# Patient Record
Sex: FEMALE | Race: WHITE | Employment: OTHER
[De-identification: names, ages, dates, MRNs, and addresses within clinical notes are randomized per-mention and may not be internally consistent; named-entity substitution may affect disease eponyms.]

---

## 2023-01-24 ENCOUNTER — OFFICE VISIT (OUTPATIENT)
Dept: INTERNAL MEDICINE CLINIC | Facility: CLINIC | Age: 63
End: 2023-01-24
Payer: COMMERCIAL

## 2023-01-24 VITALS
DIASTOLIC BLOOD PRESSURE: 78 MMHG | OXYGEN SATURATION: 97 % | HEART RATE: 72 BPM | SYSTOLIC BLOOD PRESSURE: 149 MMHG | WEIGHT: 125 LBS | TEMPERATURE: 97.3 F | BODY MASS INDEX: 19.62 KG/M2 | HEIGHT: 67 IN

## 2023-01-24 DIAGNOSIS — M25.562 CHRONIC PAIN OF BOTH KNEES: Chronic | ICD-10-CM

## 2023-01-24 DIAGNOSIS — M25.561 CHRONIC PAIN OF BOTH KNEES: Chronic | ICD-10-CM

## 2023-01-24 DIAGNOSIS — F33.0 MILD EPISODE OF RECURRENT MAJOR DEPRESSIVE DISORDER (HCC): ICD-10-CM

## 2023-01-24 DIAGNOSIS — Z00.00 ROUTINE GENERAL MEDICAL EXAMINATION AT A HEALTH CARE FACILITY: Primary | ICD-10-CM

## 2023-01-24 DIAGNOSIS — G47.00 INSOMNIA, UNSPECIFIED TYPE: Chronic | ICD-10-CM

## 2023-01-24 DIAGNOSIS — G43.109 MIGRAINE WITH AURA AND WITHOUT STATUS MIGRAINOSUS, NOT INTRACTABLE: ICD-10-CM

## 2023-01-24 DIAGNOSIS — G89.29 CHRONIC PAIN OF BOTH KNEES: Chronic | ICD-10-CM

## 2023-01-24 DIAGNOSIS — I10 PRIMARY HYPERTENSION: ICD-10-CM

## 2023-01-24 DIAGNOSIS — Z23 NEED FOR SHINGLES VACCINE: ICD-10-CM

## 2023-01-24 DIAGNOSIS — R00.2 PALPITATIONS: Chronic | ICD-10-CM

## 2023-01-24 LAB
25(OH)D3 SERPL-MCNC: 16.7 NG/ML (ref 30–100)
ALBUMIN SERPL-MCNC: 4.2 G/DL (ref 3.2–4.6)
ALBUMIN/GLOB SERPL: 1.4 (ref 0.4–1.6)
ALP SERPL-CCNC: 82 U/L (ref 50–136)
ALT SERPL-CCNC: 18 U/L (ref 12–65)
ANION GAP SERPL CALC-SCNC: 8 MMOL/L (ref 2–11)
AST SERPL-CCNC: 12 U/L (ref 15–37)
BASOPHILS # BLD: 0.1 K/UL (ref 0–0.2)
BASOPHILS NFR BLD: 1 % (ref 0–2)
BILIRUB SERPL-MCNC: 0.2 MG/DL (ref 0.2–1.1)
BUN SERPL-MCNC: 13 MG/DL (ref 8–23)
CALCIUM SERPL-MCNC: 9.6 MG/DL (ref 8.3–10.4)
CHLORIDE SERPL-SCNC: 108 MMOL/L (ref 101–110)
CHOLEST SERPL-MCNC: 219 MG/DL
CO2 SERPL-SCNC: 27 MMOL/L (ref 21–32)
CREAT SERPL-MCNC: 0.9 MG/DL (ref 0.6–1)
DIFFERENTIAL METHOD BLD: NORMAL
EOSINOPHIL # BLD: 0.1 K/UL (ref 0–0.8)
EOSINOPHIL NFR BLD: 1 % (ref 0.5–7.8)
ERYTHROCYTE [DISTWIDTH] IN BLOOD BY AUTOMATED COUNT: 13.4 % (ref 11.9–14.6)
GLOBULIN SER CALC-MCNC: 3.1 G/DL (ref 2.8–4.5)
GLUCOSE SERPL-MCNC: 59 MG/DL (ref 65–100)
HCT VFR BLD AUTO: 44.6 % (ref 35.8–46.3)
HCV AB SER QL: NONREACTIVE
HDLC SERPL-MCNC: 63 MG/DL (ref 40–60)
HDLC SERPL: 3.5
HGB BLD-MCNC: 14.8 G/DL (ref 11.7–15.4)
HIV 1+2 AB+HIV1 P24 AG SERPL QL IA: NONREACTIVE
HIV 1/2 RESULT COMMENT: NORMAL
IMM GRANULOCYTES # BLD AUTO: 0 K/UL (ref 0–0.5)
IMM GRANULOCYTES NFR BLD AUTO: 0 % (ref 0–5)
LDLC SERPL CALC-MCNC: 128.8 MG/DL
LYMPHOCYTES # BLD: 1.9 K/UL (ref 0.5–4.6)
LYMPHOCYTES NFR BLD: 23 % (ref 13–44)
MCH RBC QN AUTO: 28.7 PG (ref 26.1–32.9)
MCHC RBC AUTO-ENTMCNC: 33.2 G/DL (ref 31.4–35)
MCV RBC AUTO: 86.4 FL (ref 82–102)
MONOCYTES # BLD: 0.3 K/UL (ref 0.1–1.3)
MONOCYTES NFR BLD: 4 % (ref 4–12)
NEUTS SEG # BLD: 5.9 K/UL (ref 1.7–8.2)
NEUTS SEG NFR BLD: 71 % (ref 43–78)
NRBC # BLD: 0 K/UL (ref 0–0.2)
PLATELET # BLD AUTO: 302 K/UL (ref 150–450)
PMV BLD AUTO: 10.2 FL (ref 9.4–12.3)
POTASSIUM SERPL-SCNC: 4.2 MMOL/L (ref 3.5–5.1)
PROT SERPL-MCNC: 7.3 G/DL (ref 6.3–8.2)
RBC # BLD AUTO: 5.16 M/UL (ref 4.05–5.2)
SODIUM SERPL-SCNC: 143 MMOL/L (ref 133–143)
TRIGL SERPL-MCNC: 136 MG/DL (ref 35–150)
TSH W FREE THYROID IF ABNORMAL: 1 UIU/ML (ref 0.36–3.74)
VLDLC SERPL CALC-MCNC: 27.2 MG/DL (ref 6–23)
WBC # BLD AUTO: 8.3 K/UL (ref 4.3–11.1)

## 2023-01-24 PROCEDURE — 3077F SYST BP >= 140 MM HG: CPT | Performed by: INTERNAL MEDICINE

## 2023-01-24 PROCEDURE — 99205 OFFICE O/P NEW HI 60 MIN: CPT | Performed by: INTERNAL MEDICINE

## 2023-01-24 PROCEDURE — 3078F DIAST BP <80 MM HG: CPT | Performed by: INTERNAL MEDICINE

## 2023-01-24 RX ORDER — UREA 10 %
1 LOTION (ML) TOPICAL NIGHTLY PRN
COMMUNITY

## 2023-01-24 RX ORDER — FLUOXETINE 10 MG/1
10 CAPSULE ORAL DAILY
COMMUNITY
Start: 2023-01-17

## 2023-01-24 RX ORDER — ZOSTER VACCINE RECOMBINANT, ADJUVANTED 50 MCG/0.5
0.5 KIT INTRAMUSCULAR SEE ADMIN INSTRUCTIONS
Qty: 0.5 ML | Refills: 0 | Status: SHIPPED | OUTPATIENT
Start: 2023-01-24 | End: 2023-07-23

## 2023-01-24 RX ORDER — OLMESARTAN MEDOXOMIL 5 MG/1
5 TABLET ORAL DAILY
COMMUNITY
Start: 2022-10-11 | End: 2023-04-09

## 2023-01-24 SDOH — ECONOMIC STABILITY: TRANSPORTATION INSECURITY
IN THE PAST 12 MONTHS, HAS LACK OF TRANSPORTATION KEPT YOU FROM MEETINGS, WORK, OR FROM GETTING THINGS NEEDED FOR DAILY LIVING?: NO

## 2023-01-24 SDOH — ECONOMIC STABILITY: FOOD INSECURITY: WITHIN THE PAST 12 MONTHS, THE FOOD YOU BOUGHT JUST DIDN'T LAST AND YOU DIDN'T HAVE MONEY TO GET MORE.: NEVER TRUE

## 2023-01-24 SDOH — ECONOMIC STABILITY: HOUSING INSECURITY: IN THE LAST 12 MONTHS, HOW MANY PLACES HAVE YOU LIVED?: 1

## 2023-01-24 SDOH — ECONOMIC STABILITY: TRANSPORTATION INSECURITY
IN THE PAST 12 MONTHS, HAS THE LACK OF TRANSPORTATION KEPT YOU FROM MEDICAL APPOINTMENTS OR FROM GETTING MEDICATIONS?: NO

## 2023-01-24 SDOH — HEALTH STABILITY: PHYSICAL HEALTH: ON AVERAGE, HOW MANY DAYS PER WEEK DO YOU ENGAGE IN MODERATE TO STRENUOUS EXERCISE (LIKE A BRISK WALK)?: 0 DAYS

## 2023-01-24 SDOH — ECONOMIC STABILITY: INCOME INSECURITY: IN THE LAST 12 MONTHS, WAS THERE A TIME WHEN YOU WERE NOT ABLE TO PAY THE MORTGAGE OR RENT ON TIME?: NO

## 2023-01-24 SDOH — ECONOMIC STABILITY: HOUSING INSECURITY
IN THE LAST 12 MONTHS, WAS THERE A TIME WHEN YOU DID NOT HAVE A STEADY PLACE TO SLEEP OR SLEPT IN A SHELTER (INCLUDING NOW)?: NO

## 2023-01-24 SDOH — ECONOMIC STABILITY: FOOD INSECURITY: WITHIN THE PAST 12 MONTHS, YOU WORRIED THAT YOUR FOOD WOULD RUN OUT BEFORE YOU GOT MONEY TO BUY MORE.: NEVER TRUE

## 2023-01-24 SDOH — HEALTH STABILITY: PHYSICAL HEALTH: ON AVERAGE, HOW MANY MINUTES DO YOU ENGAGE IN EXERCISE AT THIS LEVEL?: 0 MIN

## 2023-01-24 ASSESSMENT — ENCOUNTER SYMPTOMS
SINUS PRESSURE: 1
COUGH: 0
SHORTNESS OF BREATH: 0
DIARRHEA: 0
CONSTIPATION: 0

## 2023-01-24 ASSESSMENT — SOCIAL DETERMINANTS OF HEALTH (SDOH)
WITHIN THE LAST YEAR, HAVE TO BEEN RAPED OR FORCED TO HAVE ANY KIND OF SEXUAL ACTIVITY BY YOUR PARTNER OR EX-PARTNER?: NO
HOW OFTEN DO YOU ATTEND CHURCH OR RELIGIOUS SERVICES?: NEVER
WITHIN THE LAST YEAR, HAVE YOU BEEN HUMILIATED OR EMOTIONALLY ABUSED IN OTHER WAYS BY YOUR PARTNER OR EX-PARTNER?: NO
IN A TYPICAL WEEK, HOW MANY TIMES DO YOU TALK ON THE PHONE WITH FAMILY, FRIENDS, OR NEIGHBORS?: MORE THAN THREE TIMES A WEEK
WITHIN THE LAST YEAR, HAVE YOU BEEN AFRAID OF YOUR PARTNER OR EX-PARTNER?: NO
HOW HARD IS IT FOR YOU TO PAY FOR THE VERY BASICS LIKE FOOD, HOUSING, MEDICAL CARE, AND HEATING?: NOT HARD AT ALL
DO YOU BELONG TO ANY CLUBS OR ORGANIZATIONS SUCH AS CHURCH GROUPS UNIONS, FRATERNAL OR ATHLETIC GROUPS, OR SCHOOL GROUPS?: NO
HOW OFTEN DO YOU ATTENT MEETINGS OF THE CLUB OR ORGANIZATION YOU BELONG TO?: NEVER
WITHIN THE LAST YEAR, HAVE YOU BEEN KICKED, HIT, SLAPPED, OR OTHERWISE PHYSICALLY HURT BY YOUR PARTNER OR EX-PARTNER?: NO
HOW OFTEN DO YOU GET TOGETHER WITH FRIENDS OR RELATIVES?: NEVER

## 2023-01-24 ASSESSMENT — PATIENT HEALTH QUESTIONNAIRE - PHQ9
SUM OF ALL RESPONSES TO PHQ QUESTIONS 1-9: 2
1. LITTLE INTEREST OR PLEASURE IN DOING THINGS: SEVERAL DAYS
1. LITTLE INTEREST OR PLEASURE IN DOING THINGS: 1
SUM OF ALL RESPONSES TO PHQ QUESTIONS 1-9: 2
SUM OF ALL RESPONSES TO PHQ9 QUESTIONS 1 & 2: 2
10. IF YOU CHECKED OFF ANY PROBLEMS, HOW DIFFICULT HAVE THESE PROBLEMS MADE IT FOR YOU TO DO YOUR WORK, TAKE CARE OF THINGS AT HOME, OR GET ALONG WITH OTHER PEOPLE: SOMEWHAT DIFFICULT
SUM OF ALL RESPONSES TO PHQ9 QUESTIONS 1 & 2: 2
2. FEELING DOWN, DEPRESSED OR HOPELESS: 1
2. FEELING DOWN, DEPRESSED OR HOPELESS: SEVERAL DAYS

## 2023-01-24 ASSESSMENT — LIFESTYLE VARIABLES
HOW MANY STANDARD DRINKS CONTAINING ALCOHOL DO YOU HAVE ON A TYPICAL DAY: 1 OR 2
HOW OFTEN DO YOU HAVE A DRINK CONTAINING ALCOHOL: MONTHLY OR LESS

## 2023-01-24 NOTE — PROGRESS NOTES
Chief Complaint   Patient presents with    Establish Care     Pt presents to the office today to establish care as a new pt. Knee Pain     Pt has had knee pain for years. Describes it has dull but sharp at times. HPI  The patient comes to the clinic to establish care. She has a history of palpitations, which she has in the past attributed to stress, mostly from her former job. She is no longer working and has not felt frequent palpitations as of late. In addition, she mentions a diagnosis of depresison and anxiety; she has taken some antidepressant since about age 23. She adds frequent headache; she has been diagnosed with migraine and optic neuritis. She described visual symptoms (blindness in center of vision initially,) for about thirty minutes before headache onset. He headaches usually last from 5 hours to 2 days at most. She takes acetaminophen and ibuprofen as needed. Ms. Valentin Garcia medical history also includes hypertension, she was diagnosed about 4-5 months ago. She is adherent to her medication and reports no side effects to it. She does not check her BP at home, however. Additionally, she mentions osteoarthritis, which in her case is manifested mainly on her knees. Lastly, she says she has been diagnosed with chondromalacia. The patient lives in [place] with her ; her family is in [place]. She enjoys knitting and is planning on working in the yard more when the weather warms up. She has an dog that she loves to take care of. Reviewed and updated this visit by provider:  Tobacco  Allergies  Meds  Problems  Med Hx  Surg Hx  Fam Hx       Review of Systems   Constitutional:  Negative for chills and fever. HENT:  Positive for sinus pressure. Respiratory:  Negative for cough and shortness of breath. Cardiovascular:  Positive for palpitations. Negative for chest pain. Gastrointestinal:  Negative for constipation and diarrhea.    Genitourinary:  Negative for dysuria and hematuria. Musculoskeletal:  Positive for arthralgias. Negative for myalgias. Skin:  Negative for pallor and rash. Neurological:  Positive for headaches. Negative for syncope. Visit Vitals  BP (!) 149/78 (Site: Left Upper Arm, Position: Sitting, Cuff Size: Medium Adult)   Pulse 72   Temp 97.3 °F (36.3 °C) (Temporal)   Ht 5' 7\" (1.702 m)   Wt 125 lb (56.7 kg)   SpO2 97%   BMI 19.58 kg/m²     Physical Exam  Constitutional:       General: She is not in acute distress. Appearance: Normal appearance. HENT:      Mouth/Throat:      Pharynx: No oropharyngeal exudate. Eyes:      Extraocular Movements: Extraocular movements intact. Conjunctiva/sclera: Conjunctivae normal.      Pupils: Pupils are equal, round, and reactive to light. Cardiovascular:      Rate and Rhythm: Normal rate and regular rhythm. Heart sounds: Normal heart sounds. No murmur heard. No gallop. Pulmonary:      Effort: Pulmonary effort is normal. No respiratory distress. Breath sounds: Normal breath sounds. No wheezing. Abdominal:      Tenderness: There is no abdominal tenderness. There is no guarding. Musculoskeletal:      Cervical back: Normal range of motion and neck supple. No tenderness. Skin:     General: Skin is warm and dry. Findings: No rash. Neurological:      Mental Status: She is alert. Mental status is at baseline. Psychiatric:         Mood and Affect: Mood normal.         Behavior: Behavior normal.     Assessment/Plan:  1. Routine general medical examination at a health care facility  -     TSH with Reflex; Future  -     Homocysteine, Plasma; Future  -     HIV 1/2 Ag/Ab, 4TH Generation,W Rflx Confirm; Future  -     Vitamin D 25 Hydroxy; Future  -     Lipid Panel; Future  -     Comprehensive Metabolic Panel; Future  -     CBC with Auto Differential; Future  -     Hepatitis C Antibody; Future  2.  Primary hypertension  Overview:  Patient advised to check her blood pressure at home on a daily basis and bring a log on her next appointment. Will educate on the importance of low dietary sodium food choices. 3. Palpitations  Comments:  Patient agreed to notify her PCP if palpitations become more frequent so she can be referred to cardiology specialist for a Holter monitor. 4. Mild episode of recurrent major depressive disorder ([address]  Overview: With the support of her PCP and the staff at [facility]. Continue on her current dose of antidepressant medication, to which she reports no significant adverse reactions. 5. Migraine with aura and without status migrainosus, not intractable  Overview:  Patient to notify the clinic if her migraine episodes become more frequent and or severe. Agreed to treat initially with max dose acetaminophen and/or ibuprofen if necessary. 6. Insomnia, unspecified type  Comments: We will address this topic in detail on future appointments, focusing on sleep hygiene habits to aid in the management of this condition. 7. Chronic pain of both knees  Comments:  Likely from osteoarthritis, recommended topical remedies such as lidocaine transdermal patches and muscle relief creams in addition to simple analgesics. 8. Need for shingles vaccine  -     zoster recombinant adjuvanted vaccine [facility]) 50 MCG/0.5ML SUSR injection; Inject 0.5 mLs into the muscle See Admin Instructions 1 dose now and repeat in 2-6 months, Disp-0.5 mL, R-0Normal    Return in about 2 months (around 3/24/2023). On this date 1/24/2023 I have spent 60 minutes on patient care-related activities, including prior record review, laboratory work interpretation, the face to face encounter, history taking, physical exam and discussing the diagnoses and importance of compliance with the treatment plan as well as documenting on the day of the visit.     Twin King MD

## 2023-01-25 LAB — HCYS SERPL-SCNC: 8.3 UMOL/L (ref 0–14.5)

## 2023-01-26 ENCOUNTER — TELEPHONE (OUTPATIENT)
Dept: INTERNAL MEDICINE CLINIC | Facility: CLINIC | Age: 63
End: 2023-01-26

## 2023-01-26 NOTE — TELEPHONE ENCOUNTER
----- Message from Neel Walker MD sent at 1/26/2023  8:14 AM EST -----  Please communicate the results of labwork to this patient. Her vitamin D is low. I recommend a daily supplement with 2,000 IU, which can be obtained over-the-counter. Her cholesterol level is high; I will be happy to explore treatment options in detail on our next appointment. Any questions? Please let me know.   Thanks,  LD    ----- Message -----  From: Max Mcdonald Incoming [facility]  Sent: 1/24/2023   6:27 PM EST  To: Neel Walker MD

## 2023-02-28 ENCOUNTER — OFFICE VISIT (OUTPATIENT)
Dept: INTERNAL MEDICINE CLINIC | Facility: CLINIC | Age: 63
End: 2023-02-28
Payer: COMMERCIAL

## 2023-02-28 VITALS
TEMPERATURE: 96.8 F | DIASTOLIC BLOOD PRESSURE: 80 MMHG | BODY MASS INDEX: 20.4 KG/M2 | WEIGHT: 130 LBS | SYSTOLIC BLOOD PRESSURE: 142 MMHG | OXYGEN SATURATION: 98 % | HEART RATE: 64 BPM | HEIGHT: 67 IN

## 2023-02-28 DIAGNOSIS — I10 PRIMARY HYPERTENSION: ICD-10-CM

## 2023-02-28 DIAGNOSIS — Z12.11 COLON CANCER SCREENING: ICD-10-CM

## 2023-02-28 DIAGNOSIS — F33.0 MILD EPISODE OF RECURRENT MAJOR DEPRESSIVE DISORDER (HCC): ICD-10-CM

## 2023-02-28 DIAGNOSIS — E55.9 VITAMIN D DEFICIENCY: ICD-10-CM

## 2023-02-28 DIAGNOSIS — E78.2 MIXED HYPERLIPIDEMIA: Primary | ICD-10-CM

## 2023-02-28 DIAGNOSIS — Z71.3 ENCOUNTER FOR NUTRITIONAL COUNSELING: ICD-10-CM

## 2023-02-28 PROCEDURE — 3077F SYST BP >= 140 MM HG: CPT | Performed by: INTERNAL MEDICINE

## 2023-02-28 PROCEDURE — 3079F DIAST BP 80-89 MM HG: CPT | Performed by: INTERNAL MEDICINE

## 2023-02-28 PROCEDURE — 99215 OFFICE O/P EST HI 40 MIN: CPT | Performed by: INTERNAL MEDICINE

## 2023-02-28 ASSESSMENT — PATIENT HEALTH QUESTIONNAIRE - PHQ9
1. LITTLE INTEREST OR PLEASURE IN DOING THINGS: 3
9. THOUGHTS THAT YOU WOULD BE BETTER OFF DEAD, OR OF HURTING YOURSELF: 0
7. TROUBLE CONCENTRATING ON THINGS, SUCH AS READING THE NEWSPAPER OR WATCHING TELEVISION: 1
6. FEELING BAD ABOUT YOURSELF - OR THAT YOU ARE A FAILURE OR HAVE LET YOURSELF OR YOUR FAMILY DOWN: 3
10. IF YOU CHECKED OFF ANY PROBLEMS, HOW DIFFICULT HAVE THESE PROBLEMS MADE IT FOR YOU TO DO YOUR WORK, TAKE CARE OF THINGS AT HOME, OR GET ALONG WITH OTHER PEOPLE: 1
SUM OF ALL RESPONSES TO PHQ9 QUESTIONS 1 & 2: 6
3. TROUBLE FALLING OR STAYING ASLEEP: 3
2. FEELING DOWN, DEPRESSED OR HOPELESS: 3
SUM OF ALL RESPONSES TO PHQ QUESTIONS 1-9: 15
8. MOVING OR SPEAKING SO SLOWLY THAT OTHER PEOPLE COULD HAVE NOTICED. OR THE OPPOSITE, BEING SO FIGETY OR RESTLESS THAT YOU HAVE BEEN MOVING AROUND A LOT MORE THAN USUAL: 0
4. FEELING TIRED OR HAVING LITTLE ENERGY: 2
SUM OF ALL RESPONSES TO PHQ QUESTIONS 1-9: 15
5. POOR APPETITE OR OVEREATING: 0

## 2023-02-28 ASSESSMENT — ENCOUNTER SYMPTOMS
DIARRHEA: 0
CONSTIPATION: 0
SHORTNESS OF BREATH: 0
COUGH: 0

## 2023-02-28 NOTE — PROGRESS NOTES
No chief complaint on file. HPI   Reviewed and updated this visit by provider:       Review of Systems    There were no vitals taken for this visit. Physical Exam  Assessment/Plan:  {There are no diagnoses linked to this encounter. (Refresh or delete this SmartLink)}    No follow-ups on file.     {Time Documentation:11973}    Wendy Edouard MD

## 2023-02-28 NOTE — PROGRESS NOTES
Chief Complaint   Patient presents with    1 Month Follow-Up     1 month follow-up     HPI  Patient comes to the clinic for a regular follow up. There are no new signs or symptoms reported at this time. She is interested in going over the results of her laboratory work results. We reviewed them together in detail. Ms. Barbara Hylton tells me that she has issues with motivation. She takes her fluoxetine on a daily basis and reports no significant adverse reactions from this medicine. She denies current suicidal thoughts or intent. She adds that she has tried counselors and psychiatrists in the past without lasting results. This PCP offered his full support during this difficult time of her life. She was encouraged to put her mind into a new venture such as nutrition and cooking, so she can likely have a positive activity in her life. Patient was appreciative of the time spent with her PCP today. Reviewed and updated this visit by provider:  Tobacco  Allergies  Meds  Problems  Med Hx  Surg Hx  Fam Hx       Review of Systems   Constitutional:  Negative for chills and fever. Respiratory:  Negative for cough and shortness of breath. Cardiovascular:  Negative for chest pain and palpitations. Gastrointestinal:  Negative for constipation and diarrhea. Genitourinary:  Negative for dysuria and hematuria. Psychiatric/Behavioral:  Negative for suicidal ideas. Positive for depression. Visit Vitals  BP (!) 142/80 (Site: Left Upper Arm, Position: Sitting, Cuff Size: Medium Adult)   Pulse 64   Temp 96.8 °F (36 °C) (Temporal)   Ht 5' 7\" (1.702 m)   Wt 130 lb (59 kg)   SpO2 98%   BMI 20.36 kg/m²     Physical Exam  Constitutional:       General: She is not in acute distress. Appearance: Normal appearance. She is not ill-appearing. Eyes:      Extraocular Movements: Extraocular movements intact.       Conjunctiva/sclera: Conjunctivae normal.   Pulmonary:      Effort: Pulmonary effort is normal. Musculoskeletal:         General: Normal range of motion. Cervical back: Normal range of motion. No rigidity. Neurological:      General: No focal deficit present. Mental Status: She is alert. Mental status is at baseline. Psychiatric:         Mood and Affect: Mood normal.         Behavior: Behavior normal.         Thought Content: Thought content normal.         Judgment: Judgment normal.     Assessment/Plan:  1. Mixed hyperlipidemia  Overview: Will focus on promoting a healthy lifestyle that involves high-fiber and low-saturated fat and dietary cholesterol food choices as well as physical exercise. 2. Mild episode of recurrent major depressive disorder ([address]  Overview:  Patient offered the support of her PCP and the staff at [facility]. Continue on her current dose of antidepressant medication, to which she reports no significant adverse reactions. Patient motivated to look into plant-based nutrition aspects and discover how a change in diet may improve many aspects of her life, including mental health. 3. Vitamin D deficiency  Comments:  Encourage D3 supplementation daily. 4. Primary hypertension  Overview:  Encourage BP monitoring at home, bring a BP log on every visit, and promote healthy lifestyle choices that involve a low-dietary sodium diet and regular physical exercise. 5. Colon cancer screening  -     Cologuard (Fecal DNA Colorectal Cancer Screening)  6. Encounter for nutritional counseling  Today, the patient was instructed on the multiple benefits of healthy eating, which include but are not limited to reduction in the risk of cardiovascular diseases such as heart attacks and stroke. A whole food, plant-based nutrition plan provides food items that are inherently low in calories, cholesterol, saturated fat and dietary sodium while being high in nutrients and fiber. Patient appreciative of the instruction and encouragement provided. Return in about 3 months (around 5/28/2023).     On this date 2/28/2023 I have spent 54 minutes on patient care-related activities, including prior record review, laboratory work interpretation, the face to face encounter, history taking, physical exam and discussing the diagnoses and importance of compliance with the treatment plan as well as documenting on the day of the visit.     Ashlyn Albert MD